# Patient Record
Sex: FEMALE | Race: OTHER | HISPANIC OR LATINO | URBAN - METROPOLITAN AREA
[De-identification: names, ages, dates, MRNs, and addresses within clinical notes are randomized per-mention and may not be internally consistent; named-entity substitution may affect disease eponyms.]

---

## 2017-10-04 ENCOUNTER — EMERGENCY (EMERGENCY)
Facility: HOSPITAL | Age: 22
LOS: 1 days | Discharge: HOME | End: 2017-10-04
Attending: EMERGENCY MEDICINE
Payer: COMMERCIAL

## 2017-10-04 VITALS
HEART RATE: 102 BPM | WEIGHT: 110.89 LBS | SYSTOLIC BLOOD PRESSURE: 126 MMHG | DIASTOLIC BLOOD PRESSURE: 79 MMHG | TEMPERATURE: 98 F | HEIGHT: 60 IN | RESPIRATION RATE: 19 BRPM | OXYGEN SATURATION: 100 %

## 2017-10-04 VITALS
OXYGEN SATURATION: 100 % | HEART RATE: 93 BPM | SYSTOLIC BLOOD PRESSURE: 108 MMHG | TEMPERATURE: 98 F | RESPIRATION RATE: 18 BRPM | DIASTOLIC BLOOD PRESSURE: 66 MMHG

## 2017-10-04 LAB
ANION GAP SERPL CALC-SCNC: 17 MMOL/L — SIGNIFICANT CHANGE UP (ref 5–17)
APPEARANCE UR: CLEAR — SIGNIFICANT CHANGE UP
BILIRUB UR-MCNC: NEGATIVE — SIGNIFICANT CHANGE UP
BUN SERPL-MCNC: 5 MG/DL — LOW (ref 8–20)
CALCIUM SERPL-MCNC: 9.4 MG/DL — SIGNIFICANT CHANGE UP (ref 8.6–10.2)
CHLORIDE SERPL-SCNC: 100 MMOL/L — SIGNIFICANT CHANGE UP (ref 98–107)
CO2 SERPL-SCNC: 22 MMOL/L — SIGNIFICANT CHANGE UP (ref 22–29)
COLOR SPEC: YELLOW — SIGNIFICANT CHANGE UP
CREAT SERPL-MCNC: 0.6 MG/DL — SIGNIFICANT CHANGE UP (ref 0.5–1.3)
DIFF PNL FLD: ABNORMAL
EPI CELLS # UR: SIGNIFICANT CHANGE UP
GLUCOSE SERPL-MCNC: 104 MG/DL — SIGNIFICANT CHANGE UP (ref 70–115)
GLUCOSE UR QL: NEGATIVE MG/DL — SIGNIFICANT CHANGE UP
HCG UR QL: NEGATIVE — SIGNIFICANT CHANGE UP
HCT VFR BLD CALC: 36.6 % — LOW (ref 37–47)
HGB BLD-MCNC: 12.7 G/DL — SIGNIFICANT CHANGE UP (ref 12–16)
KETONES UR-MCNC: NEGATIVE — SIGNIFICANT CHANGE UP
LEUKOCYTE ESTERASE UR-ACNC: NEGATIVE — SIGNIFICANT CHANGE UP
LIDOCAIN IGE QN: 19 U/L — LOW (ref 22–51)
MCHC RBC-ENTMCNC: 30.8 PG — SIGNIFICANT CHANGE UP (ref 27–31)
MCHC RBC-ENTMCNC: 34.7 G/DL — SIGNIFICANT CHANGE UP (ref 32–36)
MCV RBC AUTO: 88.8 FL — SIGNIFICANT CHANGE UP (ref 81–99)
NITRITE UR-MCNC: NEGATIVE — SIGNIFICANT CHANGE UP
PH UR: 6.5 — SIGNIFICANT CHANGE UP (ref 5–8)
PLATELET # BLD AUTO: 287 K/UL — SIGNIFICANT CHANGE UP (ref 150–400)
POTASSIUM SERPL-MCNC: 3.9 MMOL/L — SIGNIFICANT CHANGE UP (ref 3.5–5.3)
POTASSIUM SERPL-SCNC: 3.9 MMOL/L — SIGNIFICANT CHANGE UP (ref 3.5–5.3)
PROT UR-MCNC: NEGATIVE MG/DL — SIGNIFICANT CHANGE UP
RBC # BLD: 4.12 M/UL — LOW (ref 4.4–5.2)
RBC # FLD: 12.5 % — SIGNIFICANT CHANGE UP (ref 11–15.6)
RBC CASTS # UR COMP ASSIST: SIGNIFICANT CHANGE UP /HPF (ref 0–4)
SODIUM SERPL-SCNC: 139 MMOL/L — SIGNIFICANT CHANGE UP (ref 135–145)
SP GR SPEC: 1.01 — SIGNIFICANT CHANGE UP (ref 1.01–1.02)
UROBILINOGEN FLD QL: NEGATIVE MG/DL — SIGNIFICANT CHANGE UP
WBC # BLD: 10.6 K/UL — SIGNIFICANT CHANGE UP (ref 4.8–10.8)
WBC # FLD AUTO: 10.6 K/UL — SIGNIFICANT CHANGE UP (ref 4.8–10.8)

## 2017-10-04 PROCEDURE — 96375 TX/PRO/DX INJ NEW DRUG ADDON: CPT

## 2017-10-04 PROCEDURE — 85027 COMPLETE CBC AUTOMATED: CPT

## 2017-10-04 PROCEDURE — 83690 ASSAY OF LIPASE: CPT

## 2017-10-04 PROCEDURE — 80048 BASIC METABOLIC PNL TOTAL CA: CPT

## 2017-10-04 PROCEDURE — 81025 URINE PREGNANCY TEST: CPT

## 2017-10-04 PROCEDURE — 99284 EMERGENCY DEPT VISIT MOD MDM: CPT | Mod: 25

## 2017-10-04 PROCEDURE — 36415 COLL VENOUS BLD VENIPUNCTURE: CPT

## 2017-10-04 PROCEDURE — 81001 URINALYSIS AUTO W/SCOPE: CPT

## 2017-10-04 PROCEDURE — 96374 THER/PROPH/DIAG INJ IV PUSH: CPT

## 2017-10-04 RX ORDER — SODIUM CHLORIDE 9 MG/ML
3 INJECTION INTRAMUSCULAR; INTRAVENOUS; SUBCUTANEOUS ONCE
Qty: 0 | Refills: 0 | Status: COMPLETED | OUTPATIENT
Start: 2017-10-04 | End: 2017-10-04

## 2017-10-04 RX ORDER — KETOROLAC TROMETHAMINE 30 MG/ML
30 SYRINGE (ML) INJECTION ONCE
Qty: 0 | Refills: 0 | Status: DISCONTINUED | OUTPATIENT
Start: 2017-10-04 | End: 2017-10-04

## 2017-10-04 RX ORDER — ONDANSETRON 8 MG/1
4 TABLET, FILM COATED ORAL ONCE
Qty: 0 | Refills: 0 | Status: COMPLETED | OUTPATIENT
Start: 2017-10-04 | End: 2017-10-04

## 2017-10-04 RX ORDER — SODIUM CHLORIDE 9 MG/ML
1000 INJECTION INTRAMUSCULAR; INTRAVENOUS; SUBCUTANEOUS ONCE
Qty: 0 | Refills: 0 | Status: COMPLETED | OUTPATIENT
Start: 2017-10-04 | End: 2017-10-04

## 2017-10-04 RX ADMIN — ONDANSETRON 4 MILLIGRAM(S): 8 TABLET, FILM COATED ORAL at 08:10

## 2017-10-04 RX ADMIN — Medication 30 MILLIGRAM(S): at 09:17

## 2017-10-04 RX ADMIN — SODIUM CHLORIDE 1000 MILLILITER(S): 9 INJECTION INTRAMUSCULAR; INTRAVENOUS; SUBCUTANEOUS at 08:06

## 2017-10-04 RX ADMIN — SODIUM CHLORIDE 3 MILLILITER(S): 9 INJECTION INTRAMUSCULAR; INTRAVENOUS; SUBCUTANEOUS at 08:07

## 2017-10-04 RX ADMIN — Medication 30 MILLIGRAM(S): at 09:27

## 2017-10-04 NOTE — ED ADULT NURSE NOTE - OBJECTIVE STATEMENT
pt AOX4 c/o pelvic pain since yesterday, chest pain in her left side that radiates to her back and abdomen pain that also radiates to her back. Pt states she went to her PMD yesterday but pain has not been relived. Pt also states pain in her abdomen is associated with food ingestion, pt also c.o nausea. Pt denies any PMH.

## 2017-10-04 NOTE — ED ADULT NURSE REASSESSMENT NOTE - NS ED NURSE REASSESS COMMENT FT1
pt aware she is waiting for MD Castaneda to reevaluate her, pain is now down to a 7/10. pt ambulating without assistance to the bathroom, with sister at bedside. pt verbalized understanding.

## 2017-10-04 NOTE — ED PROVIDER NOTE - OBJECTIVE STATEMENT
PT C/O LEFT SIDED BODY PAIN SINCE YESTERDAY. SAW HER MD WHO RX'D  BACTRIM AND FLUCONAZOLE X 1. PT STATES SHE HAD AN US AND THE DOCTOR TOLD HER THAT SHE HAD A SMALL CYST AND "SOMETHING ELSE". SHE HAS A FOLLOW UP APPOINTMENT ON FRIDAY. SHE CAME TO THE ER THIS MORNING BECAUSE SHE DIDN'T FEEL BETTER. PT STATES EVERY TIME SHE EATS SHE NEEDS TO PASS STOOL. SHE STATES ITIS FORMED, NOT LOOSE. SHE ALSO HAS NAUSEA AND URINARY FREQUENCY.

## 2017-10-04 NOTE — ED ADULT TRIAGE NOTE - CHIEF COMPLAINT QUOTE
pt c/o pain to ovaries x2 weeks and left side of chest since yesterday. pt also c/o abd pains every time she eats.

## 2017-10-04 NOTE — ED PROVIDER NOTE - MEDICAL DECISION MAKING DETAILS
PT WITH LEFT SIDED BODY PAIN FROM THE CHEST TO THE ABDOMEN, NAUSEA, THICK WHITISH VAGINAL DISCHARGE, URINARY FREQUENCY. STARTED ON TMP/SMX BY MD AND FLUCONAZOLE BY MD YESTERDAY. HERE B/C NOT IMPROVED. LABS, URINE AND MEDS ORDERED TO RELIEVE SYMPTOMS PT WITH LEFT SIDED BODY PAIN FROM THE CHEST TO THE ABDOMEN, NAUSEA, THICK WHITISH VAGINAL DISCHARGE, URINARY FREQUENCY. STARTED ON TMP/SMX BY MD AND FLUCONAZOLE BY MD YESTERDAY. HERE B/C NOT IMPROVED. LABS, URINE AND MEDS ORDERED TO RELIEVE SYMPTOMS. WORK UP DOES NOT REVEAL ACUTE PATHOLOGY. PT FEELS BETTER. WILL CONTINUE MEDS FROM HER MD AND F/U ON FRIDAY WITH HER MD

## 2018-08-16 ENCOUNTER — EMERGENCY (EMERGENCY)
Facility: HOSPITAL | Age: 23
LOS: 1 days | Discharge: HOME | End: 2018-08-16
Attending: EMERGENCY MEDICINE
Payer: COMMERCIAL

## 2018-08-16 VITALS
SYSTOLIC BLOOD PRESSURE: 132 MMHG | OXYGEN SATURATION: 100 % | TEMPERATURE: 98 F | HEART RATE: 91 BPM | DIASTOLIC BLOOD PRESSURE: 83 MMHG | RESPIRATION RATE: 18 BRPM

## 2018-08-16 VITALS — WEIGHT: 117.07 LBS | HEIGHT: 60 IN

## 2018-08-16 LAB
APPEARANCE UR: CLEAR — SIGNIFICANT CHANGE UP
BACTERIA # UR AUTO: ABNORMAL
BILIRUB UR-MCNC: NEGATIVE — SIGNIFICANT CHANGE UP
COLOR SPEC: YELLOW — SIGNIFICANT CHANGE UP
DIFF PNL FLD: ABNORMAL
EPI CELLS # UR: SIGNIFICANT CHANGE UP
GLUCOSE UR QL: NEGATIVE MG/DL — SIGNIFICANT CHANGE UP
HCG UR QL: NEGATIVE — SIGNIFICANT CHANGE UP
HYALINE CASTS # UR AUTO: ABNORMAL /LPF
KETONES UR-MCNC: NEGATIVE — SIGNIFICANT CHANGE UP
LEUKOCYTE ESTERASE UR-ACNC: ABNORMAL
NITRITE UR-MCNC: NEGATIVE — SIGNIFICANT CHANGE UP
PH UR: 6 — SIGNIFICANT CHANGE UP (ref 5–8)
PROT UR-MCNC: 30 MG/DL
RBC CASTS # UR COMP ASSIST: ABNORMAL /HPF (ref 0–4)
SP GR SPEC: 1.01 — SIGNIFICANT CHANGE UP (ref 1.01–1.02)
UROBILINOGEN FLD QL: NEGATIVE MG/DL — SIGNIFICANT CHANGE UP
WBC UR QL: SIGNIFICANT CHANGE UP

## 2018-08-16 PROCEDURE — 99283 EMERGENCY DEPT VISIT LOW MDM: CPT

## 2018-08-16 PROCEDURE — 87086 URINE CULTURE/COLONY COUNT: CPT

## 2018-08-16 PROCEDURE — 81001 URINALYSIS AUTO W/SCOPE: CPT

## 2018-08-16 PROCEDURE — 81025 URINE PREGNANCY TEST: CPT

## 2018-08-16 RX ORDER — METOCLOPRAMIDE HCL 10 MG
10 TABLET ORAL ONCE
Qty: 0 | Refills: 0 | Status: COMPLETED | OUTPATIENT
Start: 2018-08-16 | End: 2018-08-16

## 2018-08-16 RX ORDER — CEPHALEXIN 500 MG
1 CAPSULE ORAL
Qty: 40 | Refills: 0 | OUTPATIENT
Start: 2018-08-16 | End: 2018-08-25

## 2018-08-16 RX ORDER — ACETAMINOPHEN 500 MG
650 TABLET ORAL ONCE
Qty: 0 | Refills: 0 | Status: COMPLETED | OUTPATIENT
Start: 2018-08-16 | End: 2018-08-16

## 2018-08-16 RX ORDER — CEPHALEXIN 500 MG
500 CAPSULE ORAL ONCE
Qty: 0 | Refills: 0 | Status: COMPLETED | OUTPATIENT
Start: 2018-08-16 | End: 2018-08-16

## 2018-08-16 RX ADMIN — Medication 650 MILLIGRAM(S): at 21:39

## 2018-08-16 RX ADMIN — Medication 500 MILLIGRAM(S): at 21:38

## 2018-08-16 RX ADMIN — Medication 10 MILLIGRAM(S): at 21:39

## 2018-08-16 NOTE — ED STATDOCS - MEDICAL DECISION MAKING DETAILS
24 y/o F headache, nausea, decreased PO intake for 2 weeks with urinary symptoms for 3 weeks - partially treated with abx. Will treat for headache and check urine.

## 2018-08-16 NOTE — ED STATDOCS - OBJECTIVE STATEMENT
22 y/o F presents to the ED c/o dysuria which onset 3 weeks ago as well headaches with associated nausea which onset 2 weeks ago. Pt describes her headaches with a severity of 6 out of 10. She says she was on abx but stopped taking due to worsening headaches; pt did not finish course, she took them for 4 to 5 days. Pt notes of subjective fevers at home as well as decreased PO intake. She has not pertinent medical hx and denies past hx of migraines. Nonsmoker. NKDA. She denies pain after eating or chills. No further complaints at this time.

## 2018-08-16 NOTE — ED STATDOCS - ATTENDING CONTRIBUTION TO CARE
I, Dwayne Ferrer, performed the initial face to face bedside interview with this patient regarding history of present illness, review of symptoms and relevant past medical, social and family history.  I completed an independent physical examination.  I was the initial provider who evaluated this patient. I have signed out the follow up of any pending tests (i.e. labs, radiological studies) to the ACP.  I have communicated the patient’s plan of care and disposition with the ACP.

## 2018-08-16 NOTE — ED ADULT TRIAGE NOTE - CHIEF COMPLAINT QUOTE
pain and burning with urination seen last week medicated with cipro stopped meds giving me a headache

## 2018-08-17 LAB
CULTURE RESULTS: SIGNIFICANT CHANGE UP
SPECIMEN SOURCE: SIGNIFICANT CHANGE UP

## 2019-11-06 ENCOUNTER — EMERGENCY (EMERGENCY)
Facility: HOSPITAL | Age: 24
LOS: 1 days | Discharge: HOME | End: 2019-11-06
Attending: EMERGENCY MEDICINE
Payer: COMMERCIAL

## 2019-11-06 VITALS
TEMPERATURE: 98 F | HEIGHT: 60 IN | DIASTOLIC BLOOD PRESSURE: 80 MMHG | SYSTOLIC BLOOD PRESSURE: 128 MMHG | HEART RATE: 92 BPM | RESPIRATION RATE: 18 BRPM | OXYGEN SATURATION: 100 % | WEIGHT: 113.98 LBS

## 2019-11-06 LAB
APPEARANCE UR: CLEAR — SIGNIFICANT CHANGE UP
BILIRUB UR-MCNC: NEGATIVE — SIGNIFICANT CHANGE UP
COLOR SPEC: YELLOW — SIGNIFICANT CHANGE UP
DIFF PNL FLD: ABNORMAL
GLUCOSE UR QL: NEGATIVE MG/DL — SIGNIFICANT CHANGE UP
HCG UR QL: NEGATIVE — SIGNIFICANT CHANGE UP
KETONES UR-MCNC: NEGATIVE — SIGNIFICANT CHANGE UP
LEUKOCYTE ESTERASE UR-ACNC: NEGATIVE — SIGNIFICANT CHANGE UP
NITRITE UR-MCNC: NEGATIVE — SIGNIFICANT CHANGE UP
PH UR: 6.5 — SIGNIFICANT CHANGE UP (ref 5–8)
PROT UR-MCNC: NEGATIVE MG/DL — SIGNIFICANT CHANGE UP
RBC CASTS # UR COMP ASSIST: SIGNIFICANT CHANGE UP /HPF (ref 0–4)
SP GR SPEC: 1.01 — SIGNIFICANT CHANGE UP (ref 1.01–1.02)
UROBILINOGEN FLD QL: NEGATIVE MG/DL — SIGNIFICANT CHANGE UP
WBC UR QL: NEGATIVE — SIGNIFICANT CHANGE UP

## 2019-11-06 PROCEDURE — 81025 URINE PREGNANCY TEST: CPT

## 2019-11-06 PROCEDURE — 99283 EMERGENCY DEPT VISIT LOW MDM: CPT

## 2019-11-06 PROCEDURE — 81001 URINALYSIS AUTO W/SCOPE: CPT

## 2019-11-06 PROCEDURE — 87086 URINE CULTURE/COLONY COUNT: CPT

## 2019-11-06 RX ORDER — CEPHALEXIN 500 MG
500 CAPSULE ORAL EVERY 12 HOURS
Refills: 0 | Status: DISCONTINUED | OUTPATIENT
Start: 2019-11-06 | End: 2019-11-16

## 2019-11-06 RX ORDER — PHENAZOPYRIDINE HCL 100 MG
100 TABLET ORAL ONCE
Refills: 0 | Status: COMPLETED | OUTPATIENT
Start: 2019-11-06 | End: 2019-11-06

## 2019-11-06 RX ORDER — PHENAZOPYRIDINE HCL 100 MG
1 TABLET ORAL
Qty: 6 | Refills: 0
Start: 2019-11-06 | End: 2019-11-07

## 2019-11-06 RX ORDER — CEPHALEXIN 500 MG
1 CAPSULE ORAL
Qty: 28 | Refills: 0
Start: 2019-11-06 | End: 2019-11-12

## 2019-11-06 RX ADMIN — Medication 500 MILLIGRAM(S): at 02:35

## 2019-11-06 RX ADMIN — Medication 100 MILLIGRAM(S): at 02:35

## 2019-11-06 NOTE — ED PROVIDER NOTE - NS ED ROS FT
ROS: CONTUSIONAL: Denies fever, chills, fatigue, wt loss. HEAD: Denies trauma, HA, Dizziness. EYE: Denies Acute visual changes, diplopia. ENMT: Denies change in hearing, tinnitus, epistaxis, difficulty swallowing, sore throat. CARDIO: Denies CP, palpitations, edema. RESP: Denies Cough, SOB , Diff breathing, hemoptysis. GI: Denies N/V, ABD pain, change in bowel movement. URINARY: Denies  pelvic pain. MS:  Denies joint pain, back pain, weakness, decreased ROM, swelling. NEURO: Denies change in gait, seizures, loss of sensation, dizziness, confusion LOC.  PSY: NO SI/HI.

## 2019-11-06 NOTE — ED PROVIDER NOTE - OBJECTIVE STATEMENT
PT with no SPMHx LMP Oct/15 presents to the ED with complaint of burning when she urinates x 10 days. PT staes that she has been having progressively worse burin with urination. PT states that pain  is sever non radiating not improved or made worse by anyhting. PT staets that she has gone to  her GYN Dr. Garces (who Rx her  Bactrim for 4 day wo improvement. PT states that she had a similar incent last mouth and was placed on metro with improvement. PT dines fever, chills, weakness, abd pain, back pian, cough, fever, chills, weakness, numbness, vaginal discharge, rash. PT with no SPMHx LMP Oct/15 presents to the ED with complaint of burning when she urinates x 10 days. PT states that she has been having progressively worse burin with urination. PT states that pain  is sever non radiating not improved or made worse by anything. PT states that she has gone to  her GYN Dr. Garces (who Rx her  Bactrim for 4 day wo improvement. PT states that she had a similar incent last mouth and was placed on metro with improvement. PT dines fever, chills, weakness, abd pain, back pian, cough, fever, chills, weakness, numbness, vaginal discharge, rash.

## 2019-11-06 NOTE — ED PROVIDER NOTE - CLINICAL SUMMARY MEDICAL DECISION MAKING FREE TEXT BOX
PT with stable VS, no acute distress, non toxic appearing, tolerating PO in the ED, PT with symptomatic UTI on ABx, will change to new medication add pain meds await culture, educated about when to return to the ED if needed. PT verbalizes that he understands all instructions and results. Pt educated about the risks vs benefits of imaging at this time and agrees that not warranted for their symptoms, and PE

## 2019-11-06 NOTE — ED PROVIDER NOTE - PATIENT PORTAL LINK FT
You can access the FollowMyHealth Patient Portal offered by [facility] by registering at the following website: [email or web address]. By joining [facility]’s FollowMyHealth portal, you will also be able to view your health information using other applications (apps) compatible with our system.

## 2019-11-06 NOTE — ED PROVIDER NOTE - CONDITION AT DISCHARGE:
[Normal] : normal appearance, no rash, nodules, vesicles, ulcers, erythema [de-identified] : warm 99.8 at time of exam.   [de-identified] : geographic tongue.  There is an area of denuded taste buds on the right side.   Improved

## 2019-11-06 NOTE — ED PROVIDER NOTE - ATTENDING CONTRIBUTION TO CARE
25 yo F presents to ED c/o dysuria x last 4 days.  Seen by PMD GYN ans started on Bactrim without relief.  No assoc fever, N/V or flank pain.  On exam afebrile, mm moist, Abd soft, NT, no CVAT.  UA as noted.  Cx sent and will Rx with Keflex with f/u as outpt

## 2019-11-07 LAB
CULTURE RESULTS: NO GROWTH — SIGNIFICANT CHANGE UP
SPECIMEN SOURCE: SIGNIFICANT CHANGE UP

## 2020-09-01 NOTE — ED PROVIDER NOTE - NO SIGNIFICANT PAST SURGICAL HISTORY
Approved  
Please obtain pre authorization per HMO insurance for Echocardiogram scheduled on 09/02/2020 at the [facility]. Please and thank you very much!!  
<<----- Click to add NO significant Past Surgical History

## 2022-06-22 ENCOUNTER — EMERGENCY (EMERGENCY)
Facility: HOSPITAL | Age: 27
LOS: 1 days | Discharge: HOME | End: 2022-06-22
Attending: EMERGENCY MEDICINE
Payer: COMMERCIAL

## 2022-06-22 VITALS
DIASTOLIC BLOOD PRESSURE: 67 MMHG | OXYGEN SATURATION: 98 % | WEIGHT: 130.07 LBS | HEART RATE: 101 BPM | SYSTOLIC BLOOD PRESSURE: 125 MMHG | HEIGHT: 60 IN | RESPIRATION RATE: 20 BRPM | TEMPERATURE: 98 F

## 2022-06-22 PROCEDURE — 99283 EMERGENCY DEPT VISIT LOW MDM: CPT

## 2022-06-22 RX ORDER — ACETAMINOPHEN 500 MG
650 TABLET ORAL ONCE
Refills: 0 | Status: COMPLETED | OUTPATIENT
Start: 2022-06-22 | End: 2022-06-22

## 2022-06-22 RX ORDER — IBUPROFEN 200 MG
600 TABLET ORAL ONCE
Refills: 0 | Status: COMPLETED | OUTPATIENT
Start: 2022-06-22 | End: 2022-06-22

## 2022-06-22 RX ADMIN — Medication 600 MILLIGRAM(S): at 03:16

## 2022-06-22 RX ADMIN — Medication 650 MILLIGRAM(S): at 03:16

## 2022-06-22 NOTE — ED PROVIDER NOTE - NS ED ATTENDING STATEMENT MOD
This was a shared visit with the TODD. I reviewed and verified the documentation and independently performed the documented:

## 2022-06-22 NOTE — ED ADULT TRIAGE NOTE - CHIEF COMPLAINT QUOTE
pt c/o right flank pain, denies N/V ,denies vaginal bleeding, since march 2022 pt has been following a doctor was told she has a small cyst, today pain got worse ,no past medical hx,

## 2022-06-22 NOTE — ED PROVIDER NOTE - NSFOLLOWUPINSTRUCTIONS_ED_ALL_ED_FT
- Ibuprofen 600mg every 6 hours as needed for pain.  - Acetaminophen 650mg every 6 hours as needed for pain.   - Doughnut pillow.   - Please bring all documentation from your ED visit to any related future follow up appointment.  - Please call to schedule follow up appointment with your primary care physician within 24-48 hours.  - Please seek immediate medical attention or return to the ED for any new/worsening, signs/symptoms, or concerns.    Feel better!

## 2022-06-22 NOTE — ED PROVIDER NOTE - PATIENT PORTAL LINK FT
You can access the FollowMyHealth Patient Portal offered by [facility] by registering at the following website: [email or web address]. By joining [facility]’s FollowMyHealth portal, you will also be able to view your health information using other applications (apps) compatible with our system.

## 2022-06-22 NOTE — ED PROVIDER NOTE - CLINICAL SUMMARY MEDICAL DECISION MAKING FREE TEXT BOX
28 yo female PMHx pilonidal cyst presents to ED c/o right buttock pain. No erythema, crepitus, fevers, drainable collection. Continue outpatient follow up. Patient to be discharged. Patient and/or guardian provided verbal/written discharge instructions and return precautions. Patient and/or guardian expresses understanding and agreement.

## 2022-06-22 NOTE — ED PROVIDER NOTE - OBJECTIVE STATEMENT
28 yo female PMHx pilonidal cyst presents to ED c/o right buttock pain. 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 Has followed up with colorectal surgeon in past. No further complaints at this time.   Denies injury/trauma, fevers, back pain, flank pain (contrary to triage). 26 yo female PMHx pilonidal cyst presents to ED c/o right buttock pain x3 months. 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 Has followed up with colorectal surgeon in past. No further complaints at this time.   Denies injury/trauma, fevers, back pain, flank pain (contrary to triage).

## 2022-06-22 NOTE — ED ADULT NURSE NOTE - NSIMPLEMENTINTERV_GEN_ALL_ED
Implemented All Universal Safety Interventions:  [place] to call system. Call bell, personal items and telephone within reach. Instruct patient to call for assistance. Room bathroom lighting operational. Non-slip footwear when patient is off stretcher. Physically safe environment: no spills, clutter or unnecessary equipment. Stretcher in lowest position, wheels locked, appropriate side rails in place.

## 2022-06-22 NOTE — ED ADULT NURSE NOTE - OBJECTIVE STATEMENT
Pt presents to ED c/o right buttock pain. Hx piloidal cyst to left side. Pain meds ordered and administered, see EMAR. Pt to be DC'd following med administration.

## 2023-08-23 ENCOUNTER — OFFICE (OUTPATIENT)
Dept: URBAN - METROPOLITAN AREA CLINIC 12 | Facility: CLINIC | Age: 28
Setting detail: OPHTHALMOLOGY
End: 2023-08-23
Payer: COMMERCIAL

## 2023-08-23 DIAGNOSIS — H10.45: ICD-10-CM

## 2023-08-23 DIAGNOSIS — H40.011: ICD-10-CM

## 2023-08-23 DIAGNOSIS — G43.109: ICD-10-CM

## 2023-08-23 DIAGNOSIS — H18.623: ICD-10-CM

## 2023-08-23 PROBLEM — H52.7 REFRACTIVE ERROR ; BOTH EYES: Status: ACTIVE | Noted: 2023-08-23

## 2023-08-23 PROCEDURE — 92004 COMPRE OPH EXAM NEW PT 1/>: CPT | Performed by: STUDENT IN AN ORGANIZED HEALTH CARE EDUCATION/TRAINING PROGRAM

## 2023-08-23 PROCEDURE — 92025 CPTRIZED CORNEAL TOPOGRAPHY: CPT | Performed by: STUDENT IN AN ORGANIZED HEALTH CARE EDUCATION/TRAINING PROGRAM

## 2023-08-23 PROCEDURE — 92083 EXTENDED VISUAL FIELD XM: CPT | Performed by: STUDENT IN AN ORGANIZED HEALTH CARE EDUCATION/TRAINING PROGRAM

## 2023-08-23 PROCEDURE — 92133 CPTRZD OPH DX IMG PST SGM ON: CPT | Performed by: STUDENT IN AN ORGANIZED HEALTH CARE EDUCATION/TRAINING PROGRAM

## 2023-08-23 ASSESSMENT — VISUAL ACUITY
OD_BCVA: 20/30
OS_BCVA: 20/60

## 2023-08-23 ASSESSMENT — REFRACTION_AUTOREFRACTION
OD_CYLINDER: -3.50
OS_CYLINDER: -0.50
OD_SPHERE: +0.25
OS_AXIS: 122
OS_SPHERE: -0.75
OD_AXIS: 051

## 2023-08-23 ASSESSMENT — SPHEQUIV_DERIVED
OD_SPHEQUIV: -1
OS_SPHEQUIV: -1
OD_SPHEQUIV: -0.125
OS_SPHEQUIV: -0.75
OS_SPHEQUIV: -1
OD_SPHEQUIV: -1.5
OD_SPHEQUIV: -1.5
OS_SPHEQUIV: -0.75

## 2023-08-23 ASSESSMENT — AXIALLENGTH_DERIVED
OS_AL: 23.794
OS_AL: 23.8935
OD_AL: 23.8579
OS_AL: 23.794
OD_AL: 23.6603
OD_AL: 23.3223
OD_AL: 23.8579
OS_AL: 23.8935

## 2023-08-23 ASSESSMENT — REFRACTION_MANIFEST
OS_AXIS: 120
OD_AXIS: 55
OS_AXIS: 110
OS_SPHERE: -0.50
OS_SPHERE: -0.75
OS_SPHERE: -0.50
OD_VA1: 20/40
OS_CYLINDER: -0.50
OD_SPHERE: +0.25
OD_VA1: 20/40
OD_SPHERE: +0.75
OD_VA1: 20/40
OD_CYLINDER: -1.25
OS_AXIS: 120
OD_AXIS: 055
OD_CYLINDER: -3.50
OS_CYLINDER: -0.50
OS_VA1: 20/25
OS_VA1: 20/20
OD_SPHERE: +0.50
OD_AXIS: 050
OS_CYLINDER: -0.50
OD_CYLINDER: -3.50

## 2023-08-23 ASSESSMENT — CONFRONTATIONAL VISUAL FIELD TEST (CVF)
OD_FINDINGS: FULL
OS_FINDINGS: FULL

## 2023-08-23 ASSESSMENT — KERATOMETRY
METHOD_AUTO_MANUAL: MANUAL
OD_K1POWER_DIOPTERS: 43.00
OS_K1POWER_DIOPTERS: 43.50
OS_K2POWER_DIOPTERS: 44.00
OS_AXISANGLE_DEGREES: 035
OD_AXISANGLE_DEGREES: 146
OD_K2POWER_DIOPTERS: 45.75

## 2023-08-23 ASSESSMENT — TONOMETRY
OD_IOP_MMHG: 13
OS_IOP_MMHG: 13

## 2023-09-11 ENCOUNTER — RX ONLY (RX ONLY)
Age: 28
End: 2023-09-11

## 2023-09-11 ENCOUNTER — OFFICE (OUTPATIENT)
Dept: URBAN - METROPOLITAN AREA CLINIC 104 | Facility: CLINIC | Age: 28
Setting detail: OPHTHALMOLOGY
End: 2023-09-11
Payer: COMMERCIAL

## 2023-09-11 DIAGNOSIS — H10.45: ICD-10-CM

## 2023-09-11 DIAGNOSIS — H18.623: ICD-10-CM

## 2023-09-11 DIAGNOSIS — H40.011: ICD-10-CM

## 2023-09-11 PROBLEM — H18.621 KERATOCONUS, UNSTABLE; RIGHT EYE, LEFT EYE: Status: ACTIVE | Noted: 2023-09-11

## 2023-09-11 PROBLEM — H18.622 KERATOCONUS, UNSTABLE; RIGHT EYE, LEFT EYE: Status: ACTIVE | Noted: 2023-09-11

## 2023-09-11 PROCEDURE — 99214 OFFICE O/P EST MOD 30 MIN: CPT | Performed by: OPHTHALMOLOGY

## 2023-09-11 PROCEDURE — 92025 CPTRIZED CORNEAL TOPOGRAPHY: CPT | Performed by: OPHTHALMOLOGY

## 2023-09-11 PROCEDURE — 76514 ECHO EXAM OF EYE THICKNESS: CPT | Performed by: OPHTHALMOLOGY

## 2023-09-11 ASSESSMENT — PACHYMETRY
OS_CT_CORRECTION: 3
OD_CT_UM: 511
OS_CT_UM: 502
OD_CT_CORRECTION: 2

## 2023-09-11 ASSESSMENT — REFRACTION_AUTOREFRACTION
OD_AXIS: 558
OS_CYLINDER: -0.50
OD_CYLINDER: -3.75
OS_SPHERE: PLANO
OD_SPHERE: +1.50
OS_AXIS: 121

## 2023-09-11 ASSESSMENT — KERATOMETRY
OD_K1POWER_DIOPTERS: 42.61
OS_AXISANGLE_DEGREES: 31
OD_AXISANGLE_DEGREES: 142
OS_K2POWER_DIOPTERS: 44.23
OD_K2POWER_DIOPTERS: 45.49
OS_K1POWER_DIOPTERS: 43.66

## 2023-09-11 ASSESSMENT — TONOMETRY
OS_IOP_MMHG: 12
OD_IOP_MMHG: 13

## 2023-09-11 ASSESSMENT — AXIALLENGTH_DERIVED
OD_AL: 23.54
OD_AL: 23.54

## 2023-09-11 ASSESSMENT — REFRACTION_MANIFEST
OD_AXIS: 558
OS_AXIS: 121
OD_SPHERE: +1.50
OD_CYLINDER: -3.75
OD_VA1: 20/70
OS_VA1: 20/30
OS_CYLINDER: -0.50
OS_SPHERE: PLANO

## 2023-09-11 ASSESSMENT — CONFRONTATIONAL VISUAL FIELD TEST (CVF)
OD_FINDINGS: FULL
OS_FINDINGS: FULL

## 2023-09-11 ASSESSMENT — SPHEQUIV_DERIVED
OD_SPHEQUIV: -0.375
OD_SPHEQUIV: -0.375

## 2023-09-11 ASSESSMENT — VISUAL ACUITY
OS_BCVA: 20/100
OD_BCVA: 20/30

## 2023-12-11 ENCOUNTER — OFFICE (OUTPATIENT)
Dept: URBAN - METROPOLITAN AREA CLINIC 104 | Facility: CLINIC | Age: 28
Setting detail: OPHTHALMOLOGY
End: 2023-12-11
Payer: COMMERCIAL

## 2023-12-11 DIAGNOSIS — H40.011: ICD-10-CM

## 2023-12-11 DIAGNOSIS — H18.623: ICD-10-CM

## 2023-12-11 DIAGNOSIS — H53.001: ICD-10-CM

## 2023-12-11 DIAGNOSIS — H10.45: ICD-10-CM

## 2023-12-11 PROCEDURE — 92025 CPTRIZED CORNEAL TOPOGRAPHY: CPT | Performed by: OPHTHALMOLOGY

## 2023-12-11 PROCEDURE — 99213 OFFICE O/P EST LOW 20 MIN: CPT | Performed by: OPHTHALMOLOGY

## 2023-12-11 ASSESSMENT — REFRACTION_MANIFEST
OD_SPHERE: +0.50
OS_VA1: 20/25
OS_CYLINDER: -0.75
OD_AXIS: 048
OS_SPHERE: PLANO
OD_VA1: 20/40
OS_AXIS: 108
OD_CYLINDER: -1.25

## 2023-12-11 ASSESSMENT — REFRACTION_AUTOREFRACTION
OD_AXIS: 061
OS_AXIS: 102
OS_SPHERE: +0.25
OD_SPHERE: +1.50
OD_CYLINDER: -4.00
OS_CYLINDER: -0.75

## 2023-12-11 ASSESSMENT — REFRACTION_CURRENTRX
OS_AXIS: 108
OD_CYLINDER: -1.25
OS_OVR_VA: 20/
OD_SPHERE: +0.50
OD_AXIS: 048
OS_SPHERE: -0.50
OS_CYLINDER: -0.50
OD_OVR_VA: 20/

## 2023-12-11 ASSESSMENT — SPHEQUIV_DERIVED
OS_SPHEQUIV: -0.125
OD_SPHEQUIV: -0.5
OD_SPHEQUIV: -0.125

## 2023-12-11 ASSESSMENT — CONFRONTATIONAL VISUAL FIELD TEST (CVF)
OD_FINDINGS: FULL
OS_FINDINGS: FULL

## 2024-03-18 ENCOUNTER — OFFICE (OUTPATIENT)
Dept: URBAN - METROPOLITAN AREA CLINIC 104 | Facility: CLINIC | Age: 29
Setting detail: OPHTHALMOLOGY
End: 2024-03-18
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
## 2024-03-18 PROCEDURE — NO SHOW FE NO SHOW FEE: Performed by: OPHTHALMOLOGY
